# Patient Record
Sex: MALE | Race: BLACK OR AFRICAN AMERICAN | URBAN - METROPOLITAN AREA
[De-identification: names, ages, dates, MRNs, and addresses within clinical notes are randomized per-mention and may not be internally consistent; named-entity substitution may affect disease eponyms.]

---

## 2018-01-14 PROBLEM — F81.9 LEARNING DISORDER: Status: ACTIVE | Noted: 2018-01-14

## 2018-01-14 PROBLEM — F34.1 DYSTHYMIC DISORDER: Status: ACTIVE | Noted: 2018-01-14

## 2018-01-14 PROBLEM — F70 IQ 50-70 (MILD MENTAL RETARDATION): Status: ACTIVE | Noted: 2018-01-14

## 2018-01-14 PROBLEM — F94.1 REACTIVE ATTACHMENT DISORDER: Status: ACTIVE | Noted: 2018-01-14

## 2018-01-16 ENCOUNTER — OFFICE VISIT (OUTPATIENT)
Dept: FAMILY MEDICINE | Facility: CLINIC | Age: 28
End: 2018-01-16
Payer: COMMERCIAL

## 2018-01-16 VITALS
WEIGHT: 157.6 LBS | SYSTOLIC BLOOD PRESSURE: 103 MMHG | HEART RATE: 72 BPM | OXYGEN SATURATION: 99 % | TEMPERATURE: 98.2 F | DIASTOLIC BLOOD PRESSURE: 62 MMHG

## 2018-01-16 DIAGNOSIS — R20.0 NUMBNESS AND TINGLING OF LOWER EXTREMITY: ICD-10-CM

## 2018-01-16 DIAGNOSIS — S20.211D CONTUSION OF RIB ON RIGHT SIDE, SUBSEQUENT ENCOUNTER: Primary | ICD-10-CM

## 2018-01-16 DIAGNOSIS — R20.2 NUMBNESS AND TINGLING OF LOWER EXTREMITY: ICD-10-CM

## 2018-01-16 RX ORDER — NAPROXEN 500 MG/1
500 TABLET ORAL 2 TIMES DAILY PRN
Qty: 30 TABLET | Refills: 1 | Status: SHIPPED | OUTPATIENT
Start: 2018-01-16 | End: 2018-01-16

## 2018-01-16 RX ORDER — ACETAMINOPHEN 325 MG/1
650 TABLET ORAL EVERY 4 HOURS PRN
Qty: 100 TABLET | Refills: 0 | Status: SHIPPED | OUTPATIENT
Start: 2018-01-16
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
## 2018-01-16 NOTE — PROGRESS NOTES
Preceptor attestation:  I personally reviewed the imaging and agree with the interpretation documented by the resident. Assessment and plan reviewed with resident and agreed upon.  Supervising physician: Augustus Irwin  [facility]
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
## 2018-01-16 NOTE — PATIENT INSTRUCTIONS
- try tylenol for pain  - Someone will call you for the neurology referral  - Call us if pain increase, can't breath, or feel unwell    [facility] [place]  [address], [place] [address]  Phone: [phone]    Appointment  Date:3/28/18  Time: 1:15pm arrival    Please contact the above clinic if you need to cancel or reschedule. Feel free to contact me with any questions. Thanks!    Rissa  Care Coordinator  [phone]      Notes sent. [facility]. Letter sent.